# Patient Record
Sex: FEMALE
[De-identification: names, ages, dates, MRNs, and addresses within clinical notes are randomized per-mention and may not be internally consistent; named-entity substitution may affect disease eponyms.]

---

## 2021-12-13 PROBLEM — Z00.00 ENCOUNTER FOR PREVENTIVE HEALTH EXAMINATION: Status: ACTIVE | Noted: 2021-12-13

## 2021-12-14 ENCOUNTER — APPOINTMENT (OUTPATIENT)
Dept: OTOLARYNGOLOGY | Facility: CLINIC | Age: 71
End: 2021-12-14
Payer: MEDICARE

## 2021-12-14 VITALS — HEIGHT: 65 IN | WEIGHT: 145 LBS | BODY MASS INDEX: 24.16 KG/M2 | TEMPERATURE: 97.6 F

## 2021-12-14 DIAGNOSIS — Z82.49 FAMILY HISTORY OF ISCHEMIC HEART DISEASE AND OTHER DISEASES OF THE CIRCULATORY SYSTEM: ICD-10-CM

## 2021-12-14 DIAGNOSIS — Z78.9 OTHER SPECIFIED HEALTH STATUS: ICD-10-CM

## 2021-12-14 PROCEDURE — 31231 NASAL ENDOSCOPY DX: CPT

## 2021-12-14 PROCEDURE — 99204 OFFICE O/P NEW MOD 45 MIN: CPT | Mod: 25

## 2021-12-14 PROCEDURE — 92567 TYMPANOMETRY: CPT

## 2021-12-14 PROCEDURE — 92557 COMPREHENSIVE HEARING TEST: CPT

## 2021-12-14 RX ORDER — PANTOPRAZOLE SODIUM 20 MG/1
TABLET, DELAYED RELEASE ORAL
Refills: 0 | Status: ACTIVE | COMMUNITY

## 2021-12-14 RX ORDER — ATORVASTATIN CALCIUM 10 MG/1
10 TABLET, FILM COATED ORAL
Refills: 0 | Status: ACTIVE | COMMUNITY

## 2021-12-14 RX ORDER — FAMOTIDINE 40 MG/1
TABLET, FILM COATED ORAL
Refills: 0 | Status: ACTIVE | COMMUNITY

## 2021-12-14 NOTE — CONSULT LETTER
[Dear  ___] : Dear  [unfilled], [Consult Letter:] : I had the pleasure of evaluating your patient, [unfilled]. [Please see my note below.] : Please see my note below. [Consult Closing:] : Thank you very much for allowing me to participate in the care of this patient.  If you have any questions, please do not hesitate to contact me. [Sincerely,] : Sincerely, [FreeTextEntry3] : Sonya Madrigal MD\par

## 2021-12-14 NOTE — HISTORY OF PRESENT ILLNESS
[de-identified] : CYRIL THOMAS is a 71 year patient Here for dizziness and vertigo. She has had symptoms since October 15. She reports constant symptoms that wax and wane. She has more than one episode a day. She has spells of spinning with disequilibrium and lightheadedness which last several seconds. They are sometimes positional. She has undergone vestibular therapy for left benign positional vertigo. She said helped but her symptoms have recurred. She now feels more off balance and dizzy. She has slight nausea, headache, and neck soreness. She has no tinnitus, otorrhea, or otalgia. She also feels like she has left sinus discomfort. She picks at the crusting in the left side of her nose. She has nasal drainage but no obstruction. She is worried she may have a sinus infection. She does have vasomotor rhinitis. She has a runny nose in the cold weather. She denies a history of  migraines. She has no history of recurrent ear infections, prior otologic surgery, ear trauma, excessive noise exposure, or family history of hearing loss.

## 2021-12-14 NOTE — ASSESSMENT
[FreeTextEntry1] : She has a history of dizziness and vertigo since October. She had some improvement with treatment for benign positional vertigo. However, she still continues to have dizziness and disequilibrium. She now also has mild headache and neck soreness. Her ear exam is normal. 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 I discussed possible etiologies of her symptoms at this time since her vertigo, other types of inner ear disease, cervical vertigo, migraines, and other neurological conditions.\par \par She has a history of vasomotor rhinitis. There is no obvious sinus infection a nasal endoscopy. She does have a deviated septum to the left.\par \par She has reflux and is on medication\par \par PLAN\par \par -findings and management options discussed in detail with the patient. \par -good aural hygiene\par -avoid using cotton swabs in the ears\par -noise precautions\par -Monitor hearing\par -avoidance of salty foods, caffeine, alcohol and nicotine may help if the dizziness is due to inner ear disease\par -meclizine prn severe dizziness\par -Consider ABR, VNG, Ecochg to evaluate for peripheral vestibulopathy. She is going to hold off for now\par -MRI with/without contrast to rule out retrocochlear pathology. I also recommended neurology evaluation. I also asked her to speak with her PCP about her symptoms to see if other work up needed. \par -Continue vestibular therapy. She may also try home exercises\par -Continue management of reflux\par -Moisturizing nasal gel as needed\par -Avoid nasal manipulation\par -Trial after Atrovent nasal spray. If she responds, she may benefit from the Clarifix procedure\par -follow up after the MRI\par -call and return earlier if any concerns or worsening symptoms. \par

## 2022-01-04 ENCOUNTER — APPOINTMENT (OUTPATIENT)
Dept: MRI IMAGING | Facility: CLINIC | Age: 72
End: 2022-01-04
Payer: MEDICARE

## 2022-01-04 ENCOUNTER — RESULT REVIEW (OUTPATIENT)
Age: 72
End: 2022-01-04

## 2022-01-04 ENCOUNTER — OUTPATIENT (OUTPATIENT)
Dept: OUTPATIENT SERVICES | Facility: HOSPITAL | Age: 72
LOS: 1 days | End: 2022-01-04

## 2022-01-04 ENCOUNTER — TRANSCRIPTION ENCOUNTER (OUTPATIENT)
Age: 72
End: 2022-01-04

## 2022-01-04 PROCEDURE — 70553 MRI BRAIN STEM W/O & W/DYE: CPT | Mod: 26,MH

## 2022-01-11 ENCOUNTER — APPOINTMENT (OUTPATIENT)
Dept: OTOLARYNGOLOGY | Facility: CLINIC | Age: 72
End: 2022-01-11

## 2022-01-27 ENCOUNTER — APPOINTMENT (OUTPATIENT)
Dept: OTOLARYNGOLOGY | Facility: CLINIC | Age: 72
End: 2022-01-27
Payer: MEDICARE

## 2022-01-27 DIAGNOSIS — R42 DIZZINESS AND GIDDINESS: ICD-10-CM

## 2022-01-27 PROCEDURE — 31575 DIAGNOSTIC LARYNGOSCOPY: CPT

## 2022-01-27 PROCEDURE — 99213 OFFICE O/P EST LOW 20 MIN: CPT | Mod: 25

## 2022-01-27 NOTE — HISTORY OF PRESENT ILLNESS
[de-identified] : CYRIL THOMAS is a 71 year patient with a history of dizziness and vertigo since October. She had the MRI. We reviewed the results. There were no suspicious masses or lesions. The there was an area of signal alteration in the right cochlea (possible neuritis/labyrinthitis but the mild asymmetry is in the left ear). There is also lingual tonsil hypertrophy. She still has mild intermittent dizziness. She also occasionally has ear pain. She said she is on oxempic. She thinks that could be causing some dizziness. She has decreased her dose. She did not go back for more vestibular therapy. She has not yet seen the neurologist. She does have an appointment with her PCP.\par \par She still has chronic rhinitis. She is using Atrovent. \par \par She has a history of reflux. She has occasional hernia. She is on medication\par \par ENT history\par She has had dizziness and vertigo since October 2021. She has vestibular therapy for BPPV.  \par no history of recurrent ear infections, prior otologic surgery, ear trauma, excessive noise exposure, or family history of hearing loss. \par MRI with/without contrast brain and IACs- questionable signal alteration of the right cochlea and/or CN VIII but without enhancement on T1-w images (low index of suspicion but mild neuritis/labyrinthitis is plausible).  incidental bulky soft tissue at the base of tongue with signal most compatible with lingual tonsillar hyperplasia\par \par She has chronic rhinitis

## 2022-01-27 NOTE — ASSESSMENT
[FreeTextEntry1] : She has mild dizziness and vertigo. She had treatment for benign positional vertigo which helped somewhat.  Some of her symptoms suggest orthostatic hypotension.  We also discussed other possible etiologies. She had a mild asymmetry in the left ear an audiogram. MRI was negative for masses. There was questionable signal alteration of the right cochlea which could indicate neuronitis/labyrinthitis. Repeat evaluation of the base of tongue showed benign findings.\par \par She has a history of vasomotor rhinitis.  \par \par She has reflux and is on medication\par \par PLAN\par \par -findings and management options discussed in detail with the patient. \par -good aural hygiene\par -avoid using cotton swabs in the ears\par -noise precautions\par -Monitor hearing\par -continue to avoid of salty foods, caffeine, alcohol and nicotine  \par -we may Consider ABR, VNG, Ecochg to evaluate for peripheral vestibulopathy.\par -Continue vestibular therapy. \par -I recommended neurology evaluation to rule out other sources of dizziness. She is also going to see her PCP. \par -she is to go skiing soon. I've asked her to not ski unless she does not feel dizzy\par -Continue management of reflux\par -Moisturizing nasal gel as needed\par -Avoid nasal manipulation\par - Atrovent nasal spray. she may benefit from the Clarifix procedure if the atrovent helps\par -follow up in 3 months if doing well. \par -call and return earlier if any concerns or worsening symptoms. \par

## 2022-01-27 NOTE — CONSULT LETTER
[Dear  ___] : Dear  [unfilled], [Courtesy Letter:] : I had the pleasure of seeing your patient, [unfilled], in my office today. [Please see my note below.] : Please see my note below. [Consult Closing:] : Thank you very much for allowing me to participate in the care of this patient.  If you have any questions, please do not hesitate to contact me. [Sincerely,] : Sincerely, [FreeTextEntry3] : Sonya Madrigal MD\par

## 2022-12-08 ENCOUNTER — APPOINTMENT (OUTPATIENT)
Dept: OTOLARYNGOLOGY | Facility: CLINIC | Age: 72
End: 2022-12-08

## 2022-12-08 VITALS — BODY MASS INDEX: 26.16 KG/M2 | HEIGHT: 65 IN | WEIGHT: 157 LBS | TEMPERATURE: 96.7 F

## 2022-12-08 DIAGNOSIS — H90.3 SENSORINEURAL HEARING LOSS, BILATERAL: ICD-10-CM

## 2022-12-08 PROCEDURE — 99213 OFFICE O/P EST LOW 20 MIN: CPT | Mod: 25

## 2022-12-08 PROCEDURE — 31575 DIAGNOSTIC LARYNGOSCOPY: CPT

## 2022-12-08 NOTE — ASSESSMENT
[FreeTextEntry1] : She has a history of chronic rhinitis.  I did not see any evidence of a nasal or sinus infection that would be causing the headaches.  She had a recent MRI.  She does have some irritation of the nasal septum bilaterally from picking at crusting and manipulating it.\par \par She has mild to moderate lingual tonsil hypertrophy which appeared symmetrical and benign\par \par PLAN\par \par -findings and management options discussed in detail with the patient. \par -She was asked to avoid nasal manipulation\par -I recommended a moisturizing nasal gel or topical antibiotic ointment as needed\par -Humidifier for the dryness\par -I recommended neurology evaluation for the headaches\par -Monitor hearing\par -I will see her back next year if she is doing well.\par -call and return earlier if any concerns or worsening symptoms. \par

## 2022-12-08 NOTE — HISTORY OF PRESENT ILLNESS
[de-identified] : CYRIL THOMAS is a 72 year old patient here for a possible nasal infection.  She said that she does manipulate her nose and pick at crusting.  About 10 days ago, she developed headaches.  The occur in the back of her head and her bad in the morning.  She is concerned she may have a nasal infection.  She has not had any fevers or stiff neck.  She had an MRI on December 6.  There were chronic white matter changes.  I was able to review the scan and there did not appear to be significant sinus inflammation.  She has a history of vertigo.  She has not had further episodes since her last visit\par \par ENT history\par ENT history\par She has had dizziness and vertigo since October 2021. She has vestibular therapy for BPPV. \par no history of recurrent ear infections, prior otologic surgery, ear trauma, excessive noise exposure, or family history of hearing loss. \par MRI with/without contrast brain and IACs- questionable signal alteration of the right cochlea and/or CN VIII but without enhancement on T1-w images (low index of suspicion but mild neuritis/labyrinthitis is plausible). incidental bulky soft tissue at the base of tongue with signal most compatible with lingual tonsillar hyperplasia\par \par She has chronic rhinitis\par She has a history of reflux

## 2023-01-03 ENCOUNTER — RX RENEWAL (OUTPATIENT)
Age: 73
End: 2023-01-03

## 2023-01-03 RX ORDER — IPRATROPIUM BROMIDE 21 UG/1
0.03 SPRAY NASAL
Qty: 1 | Refills: 3 | Status: ACTIVE | COMMUNITY
Start: 2021-12-14 | End: 1900-01-01

## 2023-09-13 ENCOUNTER — NON-APPOINTMENT (OUTPATIENT)
Age: 73
End: 2023-09-13

## 2023-09-21 ENCOUNTER — APPOINTMENT (OUTPATIENT)
Dept: OTOLARYNGOLOGY | Facility: CLINIC | Age: 73
End: 2023-09-21
Payer: MEDICARE

## 2023-09-21 DIAGNOSIS — K21.9 GASTRO-ESOPHAGEAL REFLUX DISEASE W/OUT ESOPHAGITIS: ICD-10-CM

## 2023-09-21 DIAGNOSIS — R05.9 COUGH, UNSPECIFIED: ICD-10-CM

## 2023-09-21 DIAGNOSIS — J31.0 CHRONIC RHINITIS: ICD-10-CM

## 2023-09-21 DIAGNOSIS — J35.1 HYPERTROPHY OF TONSILS: ICD-10-CM

## 2023-09-21 DIAGNOSIS — R09.82 POSTNASAL DRIP: ICD-10-CM

## 2023-09-21 PROCEDURE — 99213 OFFICE O/P EST LOW 20 MIN: CPT | Mod: 25

## 2023-09-21 PROCEDURE — 31575 DIAGNOSTIC LARYNGOSCOPY: CPT

## 2023-09-21 RX ORDER — FLUTICASONE PROPIONATE 50 UG/1
50 SPRAY, METERED NASAL DAILY
Qty: 1 | Refills: 4 | Status: ACTIVE | COMMUNITY
Start: 2023-09-21 | End: 1900-01-01